# Patient Record
Sex: MALE | Race: WHITE | URBAN - METROPOLITAN AREA
[De-identification: names, ages, dates, MRNs, and addresses within clinical notes are randomized per-mention and may not be internally consistent; named-entity substitution may affect disease eponyms.]

---

## 2018-01-11 ENCOUNTER — HISTORICAL (OUTPATIENT)
Dept: CARDIOLOGY | Facility: HOSPITAL | Age: 73
End: 2018-01-11

## 2022-04-30 NOTE — OP NOTE
DATE OF SURGERY:    01/11/2018    SURGEON:  Debby Bone MD    INDICATIONS:    1. Intermittent claudication of bilateral lower extremities.  2. Left SFA .  3. Right common iliac artery aneurysm.    PROCEDURES:    1. Abdominal aortogram with bilateral iliac runoff.  2. Selective left lower extremity angiogram.  3. CSI orbital atherectomy, PTA and stenting of left SFA.  4. Covered stent iCAST to the right common iliac artery.  5. Right ultrasound guidance femoral artery access.    PROCEDURE IN DETAIL:  After informed consent was obtained, the patient was brought to the cardiac catheterization laboratory.  6-[place] sheath was placed in the right femoral artery using ultrasound guided access.  The 5-[place] OMNI Flush catheter was placed in the abdominal aorta.  The abdominal aorta was noted bilaterally and runoffs were performed.  A 6-[place] 45cm sheath was placed in the left external iliac artery and selective left lower extremity angiogram was performed.  The lesion is from the ostium, proximal part of the SFA to the distal part of the SFA, 100% occluded with severe calcification.  The lesion crossed by multiple wires and multiple catheters were used. I used 0.35 Quick-Cross catheter and regular Glidewire and Astato 30 to cross .  Once the wire was crossed and intraluminal position of the catheter was confirmed by injecting dye into the popliteal artery through the catheter and a Viperwire was placed in the peroneal artery.  A 1.5 classic CSI orbital atherectomy was used from proximal to distal SFA.  Multiple runs were done at low, medium and high.  Post-atherectomy 5 x 150 [employer] Madhav balloon was used to do angioplasty.  Multiple angioplasty was done of the SFA at 8 jayant for 30 seconds.  Post-angioplasty, there is significant dissection omitting the flow.  I placed 6 x 150 mm vascular stent x2 in the proximal to distal SFA.  Both stents were postdilated by using 6 x 200 [employer] stent and [employer] balloon at 8 jayant for 30 seconds.  Post-stenting angiogram shows the lesion reduced from 100% to 10% without any complications.  There is a good distal flow.  There is some disease at the TP trunk and there is good distal flow.  The patient tolerated the procedure very well.       The wires and catheters were removed and 7-[place] 23-cm catheter was placed in the right femoral artery and a 9 x 59 iCAST covered stent was deployed at right common iliac artery and the proximal part of the stent was post-dilated using a 10 x 20-mm [place] [employer] balloon at 14 jayant for 30 seconds.  Post-stenting angiogram shows there is some residual leak into the aneurysm, but no complications noted.  No perforations.  The patient tolerated the procedure well.  Sheath is sutured to the skin and can be removed later.       Total contrast used was 220 mg of Isovue.     Total fluoro time 30 minutes.     No tissue/specimen.     Estimated blood loss 250 mL.    FINDINGS:    1. Left SFA  and severe calcification.  2. Status post CSI orbital atherectomy, PTA and self-expanding stent, 6 x 150 mm x2 extending from proximal to distal SFA.  3. Status post covered stent iCAST 9 x 59 to the right common iliac artery aneurysm.    PLANS:    1. Continue dual antiplatelet agents.  2. Risk factor modification.  3. Statin to keep LDL less than 70.  4. Consider PTA atherectomy of the right SFA, possibly popliteal approach or pedal approach.        ______________________________  MD SANDRA Vaz/JANELL  DD:  01/12/2018  Time:  08:16AM  DT:  01/13/2018  Time:  07:37AM  Job #:  [id number]